# Patient Record
Sex: MALE | Race: BLACK OR AFRICAN AMERICAN | NOT HISPANIC OR LATINO | Employment: FULL TIME | URBAN - METROPOLITAN AREA
[De-identification: names, ages, dates, MRNs, and addresses within clinical notes are randomized per-mention and may not be internally consistent; named-entity substitution may affect disease eponyms.]

---

## 2021-03-02 PROBLEM — G89.29 CHRONIC LEFT SHOULDER PAIN: Status: ACTIVE | Noted: 2021-03-02

## 2021-03-02 PROBLEM — M25.512 CHRONIC LEFT SHOULDER PAIN: Status: ACTIVE | Noted: 2021-03-02

## 2021-10-28 PROBLEM — M25.511 RIGHT SHOULDER PAIN: Status: ACTIVE | Noted: 2021-03-02

## 2021-10-28 PROBLEM — S43.402A SPRAIN OF SHOULDER, LEFT: Status: ACTIVE | Noted: 2021-10-28

## 2021-10-28 PROBLEM — M54.10 RADICULOPATHY: Status: ACTIVE | Noted: 2021-10-28

## 2021-10-28 PROBLEM — S43.401A SPRAIN OF SHOULDER, RIGHT: Status: ACTIVE | Noted: 2021-10-28

## 2021-12-28 PROBLEM — H61.23 BILATERAL IMPACTED CERUMEN: Status: ACTIVE | Noted: 2021-12-28

## 2022-02-28 PROBLEM — R63.4 WEIGHT LOSS: Status: ACTIVE | Noted: 2022-02-28

## 2022-02-28 PROBLEM — D64.9 SYMPTOMATIC ANEMIA: Status: ACTIVE | Noted: 2022-02-28

## 2022-02-28 PROBLEM — R06.00 DYSPNEA: Status: ACTIVE | Noted: 2022-02-28

## 2022-03-01 PROBLEM — K31.4 GASTRIC DIVERTICULUM: Status: ACTIVE | Noted: 2022-03-01

## 2022-03-01 PROBLEM — I50.20 HFREF (HEART FAILURE WITH REDUCED EJECTION FRACTION): Status: ACTIVE | Noted: 2022-03-01

## 2022-07-22 LAB — CRC RECOMMENDATION EXT: NORMAL

## 2023-01-11 ENCOUNTER — PATIENT OUTREACH (OUTPATIENT)
Dept: ADMINISTRATIVE | Facility: HOSPITAL | Age: 63
End: 2023-01-11

## 2023-01-11 NOTE — PROGRESS NOTES
Population Health Outreach.Records Received, hyper-linked into chart at this time. The following record(s)  below were uploaded for Health Maintenance .      COLONOSCOPY = 7.22.22  
Family

## 2023-10-31 PROBLEM — H61.23 BILATERAL IMPACTED CERUMEN: Status: RESOLVED | Noted: 2021-12-28 | Resolved: 2023-10-31

## 2023-10-31 PROBLEM — I10 PRIMARY HYPERTENSION: Status: ACTIVE | Noted: 2023-10-31

## 2023-10-31 PROBLEM — S43.401A SPRAIN OF SHOULDER, RIGHT: Status: RESOLVED | Noted: 2021-10-28 | Resolved: 2023-10-31

## 2023-10-31 PROBLEM — S43.402A SPRAIN OF SHOULDER, LEFT: Status: RESOLVED | Noted: 2021-10-28 | Resolved: 2023-10-31

## 2023-10-31 PROBLEM — R06.00 DYSPNEA: Status: RESOLVED | Noted: 2022-02-28 | Resolved: 2023-10-31

## 2024-09-18 DIAGNOSIS — I10 PRIMARY HYPERTENSION: ICD-10-CM
